# Patient Record
(demographics unavailable — no encounter records)

---

## 2025-02-08 NOTE — PHYSICAL EXAM
[Alert] : alert [No Acute Distress] : no acute distress [Thyroid Not Enlarged] : the thyroid was not enlarged [No Respiratory Distress] : no respiratory distress [Clear to Auscultation] : lungs were clear to auscultation bilaterally [Normal PMI] : the apical impulse was normal [Normal Rate] : heart rate was normal [de-identified] : 2 x 2 cm lesion, in the subcutaneous tissue

## 2025-02-08 NOTE — DATA REVIEWED
[FreeTextEntry1] : The DEXA scan revealed Osteopenia. The TSH and Free t4 were normal. The FBS and HbA1c are slightly elevated.

## 2025-02-08 NOTE — ASSESSMENT
[FreeTextEntry1] : Patient is clinically euthyroid Her weight is stable Her US thyroid was unchanged on 8/24 Will continue the same thyroid medication Will repeat the thyroid function tests before next visit Advised to take the Levothyroxine alone 1/2 hour before breakfast The Prediabetes is stable The lipid panel was fine The DEXA scan revealed Osteopenia slightly worse Advised to continue Calcium plus Vitamin D pills

## 2025-02-08 NOTE — PHYSICAL EXAM
[Alert] : alert [No Acute Distress] : no acute distress [Thyroid Not Enlarged] : the thyroid was not enlarged [No Respiratory Distress] : no respiratory distress [Clear to Auscultation] : lungs were clear to auscultation bilaterally [Normal PMI] : the apical impulse was normal [Normal Rate] : heart rate was normal [de-identified] : 2 x 2 cm lesion, in the subcutaneous tissue

## 2025-02-08 NOTE — HISTORY OF PRESENT ILLNESS
[FreeTextEntry1] : Patient is doing well, denies feeling tired, having cold intolerance, or palpitations. Denies dryness of the skin or hair loss. She denies chest pain or SOB. Taking the Levothyroxine regularly 1/2 hour before breakfast. She has gained weight.

## 2025-05-23 NOTE — PHYSICAL EXAM
[Alert] : alert [No Acute Distress] : no acute distress [Thyroid Not Enlarged] : the thyroid was not enlarged [No Respiratory Distress] : no respiratory distress [Clear to Auscultation] : lungs were clear to auscultation bilaterally [Normal PMI] : the apical impulse was normal [Normal Rate] : heart rate was normal

## 2025-05-23 NOTE — ASSESSMENT
[FreeTextEntry1] : Patient is clinically euthyroid Her TSH and Free T4 were normal Her weight is stable Will continue the same thyroid medication Will repeat the thyroid function tests before next visit Will order a new US thyroid Advised to take the Levothyroxine alone 1/2 hour before breakfast